# Patient Record
Sex: MALE | Employment: UNEMPLOYED | URBAN - METROPOLITAN AREA
[De-identification: names, ages, dates, MRNs, and addresses within clinical notes are randomized per-mention and may not be internally consistent; named-entity substitution may affect disease eponyms.]

---

## 2021-08-11 ENCOUNTER — OFFICE VISIT (OUTPATIENT)
Dept: HEMATOLOGY | Age: 36
End: 2021-08-11
Payer: MEDICAID

## 2021-08-11 ENCOUNTER — HOSPITAL ENCOUNTER (OUTPATIENT)
Dept: LAB | Age: 36
Discharge: HOME | End: 2021-08-11

## 2021-08-11 VITALS
BODY MASS INDEX: 29.82 KG/M2 | RESPIRATION RATE: 18 BRPM | OXYGEN SATURATION: 98 % | SYSTOLIC BLOOD PRESSURE: 112 MMHG | WEIGHT: 225 LBS | DIASTOLIC BLOOD PRESSURE: 68 MMHG | HEART RATE: 75 BPM | HEIGHT: 73 IN | TEMPERATURE: 98.2 F

## 2021-08-11 DIAGNOSIS — R74.8 ELEVATED LIVER ENZYMES: Primary | ICD-10-CM

## 2021-08-11 LAB — SENTARA SPECIMEN COL,SENBCF: NORMAL

## 2021-08-11 PROCEDURE — 99204 OFFICE O/P NEW MOD 45 MIN: CPT | Performed by: NURSE PRACTITIONER

## 2021-08-11 PROCEDURE — 99001 SPECIMEN HANDLING PT-LAB: CPT

## 2021-08-11 RX ORDER — FAMOTIDINE 20 MG/1
20 TABLET, FILM COATED ORAL 2 TIMES DAILY
COMMUNITY

## 2021-08-11 RX ORDER — LUBIPROSTONE 24 UG/1
24 CAPSULE, GELATIN COATED ORAL 2 TIMES DAILY WITH MEALS
COMMUNITY
Start: 2020-12-07 | End: 2021-12-07

## 2021-08-11 RX ORDER — TRETINOIN 0.25 MG/G
CREAM TOPICAL
COMMUNITY
Start: 2021-07-31

## 2021-08-11 RX ORDER — METFORMIN HYDROCHLORIDE 500 MG/1
500 TABLET ORAL 2 TIMES DAILY WITH MEALS
COMMUNITY
Start: 2021-01-21

## 2021-08-11 RX ORDER — PANTOPRAZOLE SODIUM 20 MG/1
20 TABLET, DELAYED RELEASE ORAL 2 TIMES DAILY
COMMUNITY
Start: 2021-01-21 | End: 2022-01-21

## 2021-08-11 NOTE — PROGRESS NOTES
[address] [place] [address], MD, Domo Grande MD, MPH      Boris Lezama, RENEE Arreola, [facility]     Sonya Chavez [facility]   [place] MILDR[facility]       Stephen Shelby [facility] 136    at [address], [address] [place] [address] 22.    [phone]    FAX: [address] [place] [address], [address]    [phone]    FAX: [phone]       Patient Care Team:  Stanislaw Maharaj DO as PCP - General (Family Medicine)     The patient  Arcelia Gimenez. is self referred for evaluation of his liver and to screen for HCV. The patient is a 38 y.o.  male who was diagnosed with IBD and complains of constipation. Serologic evaluation for markers of chronic liver disease has either not been performed or the results are not available. Imaging of the liver was either not performed or the results are not available to me. An assessment of liver fibrosis with biopsy, Fibroscan or elastography has not been performed. The patient has the following symptoms which could be attributed to the liver disorder:  pain in the right side over the liver, nausea, constipation. The patient is not experiencing the following symptoms which are commonly seen in this liver disorder: fatigue, yellowing of the eyes or skin, itching, dark urine. The patient has Mild limitations in functional activities which can be attributed to other medical problems that are not related to the liver disease.       ASSESSMENT AND PLAN:  Assess for HCV    Liver transaminases are normal.  ALP is normal. Liver function is normal.  The platelet count is normal.      Serologic testing for causes of chronic liver disease was ordered. All was negative. Additional serologic tests to screen for other causes of chronic liver disease were negative. The need to assess liver fibrosis was discussed. Shear wave elastography can assess liver fibrosis and determine if a patient has advanced fibrosis or cirrhosis without the need for liver biopsy. This will be scheduled. If the elastography suggests advanced fibrosis then a liver biopsy should be considered. The elastography can be repeated annually or as often as clinically indicated to assess for fibrosis progression and/or regression. If the liver enzymes remain normal and Elastography remains normal or near normal over the next 1 year than no further monitoring is needed. Have performed laboratory testing to monitor liver function and degree of liver injury. This included BMP, hepatic panel, CBC with platelet count. Will perform imaging of the liver with ultrasound. Screening for Hepatocellular Carcinoma  HCC screening is not necessary if the patient has no evidence of cirrhosis. Treatment of other medical problems in patients with chronic liver disease  There are no contraindications for the patient to take most medications that are necessary for treatment of other medical issues. The patient can take any medications utilized for treatment of DM, statins to treat hypercholesterolemia    Normal doses of acetaminophen, as recommended on the label of the bottle, are not hepatotoxic except in the setting of daily alcohol use, even in patients with cirrhosis and can be utilized for pain. Counseling for alcohol in patients with chronic liver disease  The patient does not have a chronic liver disease and does not have to be abstinent from alcohol. Vaccinations   Vaccination for viral hepatitis A and B is recommended since the patient has no serologic evidence of previous exposure or vaccination with immunity.   Routine vaccinations against other bacterial and viral agents can be performed as indicated. Annual flu vaccination should be administered if indicated. ALLERGIES  Allergies   Allergen Reactions    Motrin [Ibuprofen] Other (comments)       MEDICATIONS  Current Outpatient Medications   Medication Sig    pantoprazole (PROTONIX) 20 mg tablet Take 20 mg by mouth two (2) times a day.  metFORMIN (GLUCOPHAGE) 500 mg tablet Take 500 mg by mouth two (2) times daily (with meals).  famotidine (Pepcid) 20 mg tablet Take 20 mg by mouth two (2) times a day.  peppermint oil (IBGARD PO) Take  by mouth.  lubiPROStone (AMITIZA) 24 mcg capsule Take 24 mcg by mouth two (2) times daily (with meals).  tretinoin (RETIN-A) 0.025 % topical cream APPLY TO FACE EVERY NIGHT FOR ACNE     No current facility-administered medications for this visit. SYSTEM REVIEW NOT RELATED TO LIVER DISEASE OR REVIEWED ABOVE:  Constitution systems: Negative for fever, chills, weight gain, weight loss. Eyes: Negative for visual changes. ENT: Negative for sore throat, painful swallowing. Respiratory: Negative for cough, hemoptysis, SOB. Cardiology: Negative for chest pain, palpitations. GI:  Negative for constipation or diarrhea. : Negative for urinary frequency, dysuria, hematuria, nocturia. Skin: Negative for rash. Hematology: Negative for easy bruising, blood clots. Musculo-skelatal: Negative for back pain, muscle pain, weakness. Neurologic: Negative for headaches, dizziness, vertigo, memory problems not related to HE. Psychology: Negative for anxiety, depression. FAMILY HISTORY:  The father  Has/had the following following chronic disease(s): Type 1 DM, CAD, pacemaker  The mother Has/had the following chronic disease(s): DM, GERD. The following family members have liver disease: Uncle had cirrhosis secondary to alcohol. SOCIAL HISTORY:  The patient is single.   The patient has 5 children,   The patient currently smokes 1 pack of tobacco daily. The patient has previously consumed alcohol in excess. The patient has been abstinent from alcohol since 2019. The patient is unemployed. PHYSICAL EXAMINATION:  Visit Vitals  /68 (BP 1 Location: Left upper arm, BP Patient Position: Sitting, BP Cuff Size: Large adult)   Pulse 75   Temp 98.2 °F (36.8 °C)   Resp 18   Ht 6' 1\" (1.854 m)   Wt 225 lb (102.1 kg)   SpO2 98%   BMI 29.69 kg/m²     General: No acute distress. Eyes: Sclera anicteric. ENT: No oral lesions. Thyroid normal.  Nodes: No adenopathy. Skin: No spider angiomata. No jaundice. No palmar erythema. Respiratory: Lungs clear to auscultation. Cardiovascular: Regular heart rate. No murmurs. No JVD. Abdomen: Soft non-tender. Liver size normal to percussion/palpation. Spleen not palpable. No obvious ascites. Extremities: No edema. No muscle wasting. No gross arthritic changes. Neurologic: Alert and oriented. Cranial nerves grossly intact. No asterixis. LABORATORY STUDIES:  [facility] [place] [facility] [address] [place] [address] Units 8/11/2021   WBC 4.0 - 11.0 K/uL 4.9   ANC 1.8 - 7.7 K/uL 2.5   HGB 13.2 - 17.3 g/dL 13.0 (L)    - 440 K/uL 297   AST 10 - 37 U/L 13   ALT 5 - 40 U/L 19   Alk Phos 25 - 115 U/L 66   Bili, Total 0.2 - 1.2 mg/dL 0.5   Bili, Direct 0.0 - 0.3 mg/dL <0.2   Albumin 3.5 - 5.0 g/dL 4.5   BUN 6 - 22 mg/dL 9   Creat 0.5 - 1.2 mg/dL 0.8   Na 133 - 145 mmol/L 139   K 3.5 - 5.5 mmol/L 4.3   Cl 98 - 110 mmol/L 102   CO2 20 - 32 mmol/L 27   Glucose 70 - 99 mg/dL 90     SEROLOGIES:  Serologies Latest Ref Rng & Units 8/11/2021   Hep A Ab, Total Negative Negative   Hep B Surface Ag None Detec None Detected   Hep B Core Ab, Total None Detec None Detected   Hep B Surface Ab None Detec None Detected   Ferritin 22 - 322 ng/mL 57   Iron % Saturation 20 - 50 % 22 8/2021. Anti HCV. Negative.   HgA1c 5.6%    LIVER HISTOLOGY:  Not available or performed    ENDOSCOPIC PROCEDURES:  Not available or performed    RADIOLOGY:  Not available or performed    OTHER TESTING:  Not available or performed    FOLLOW-UP:  All of the issues listed above in the Assessment and Plan were discussed with the patient. All questions were answered. The patient expressed a clear understanding of the above. [address] [address] in 4 weeks to review all data and determine the treatment plan.       Cherise Ganser, AGPCNP-BC  [address] [facility] [place] [facility] [place] [address] [address]  [address] [place] [address], [address] [place] [address] Street [address] [phone]

## 2021-08-12 LAB
A-G RATIO,AGRAT: 1.5 RATIO (ref 1.1–2.6)
ABSOLUTE LYMPHOCYTE COUNT, 10803: 2 K/UL (ref 1–4.8)
ALBUMIN SERPL-MCNC: 4.5 G/DL (ref 3.5–5)
ALP SERPL-CCNC: 66 U/L (ref 25–115)
ALT SERPL-CCNC: 19 U/L (ref 5–40)
ANION GAP SERPL CALC-SCNC: 10 MMOL/L (ref 3–15)
AST SERPL W P-5'-P-CCNC: 13 U/L (ref 10–37)
AVG GLU, 10930: 114 MG/DL (ref 91–123)
BASOPHILS # BLD: 0 K/UL (ref 0–0.2)
BASOPHILS NFR BLD: 0 % (ref 0–2)
BILIRUB SERPL-MCNC: 0.5 MG/DL (ref 0.2–1.2)
BILIRUBIN, DIRECT,CBIL: <0.2 MG/DL (ref 0–0.3)
BUN SERPL-MCNC: 9 MG/DL (ref 6–22)
CALCIUM SERPL-MCNC: 9.7 MG/DL (ref 8.4–10.5)
CHLORIDE SERPL-SCNC: 102 MMOL/L (ref 98–110)
CO2 SERPL-SCNC: 27 MMOL/L (ref 20–32)
CREAT SERPL-MCNC: 0.8 MG/DL (ref 0.5–1.2)
EOSINOPHIL # BLD: 0 K/UL (ref 0–0.5)
EOSINOPHIL NFR BLD: 1 % (ref 0–6)
ERYTHROCYTE [DISTWIDTH] IN BLOOD BY AUTOMATED COUNT: 15.3 % (ref 10–15.5)
FE % SATURATION,PSAT: 22 % (ref 20–50)
FERRITIN SERPL-MCNC: 57 NG/ML (ref 22–322)
GFRAA, 66117: >60
GFRNA, 66118: >60
GLOBULIN,GLOB: 3.1 G/DL (ref 2–4)
GLUCOSE SERPL-MCNC: 90 MG/DL (ref 70–99)
GRANULOCYTES,GRANS: 51 % (ref 40–75)
HBA1C MFR BLD HPLC: 5.6 % (ref 4.8–5.6)
HBV SURFACE AB SER RIA-ACNC: NORMAL
HCT VFR BLD AUTO: 40.4 % (ref 36.6–51.9)
HCV AB SER IA-ACNC: NORMAL
HEP B CORE AB, TOTAL, 006718: NORMAL
HEP B SURFACE AG SCRN, 006510: NORMAL
HGB BLD-MCNC: 13 G/DL (ref 13.2–17.3)
IRON,IRN: 73 MCG/DL (ref 45–160)
LYMPHOCYTES, LYMLT: 40 % (ref 20–45)
MCH RBC QN AUTO: 33 PG (ref 26–34)
MCHC RBC AUTO-ENTMCNC: 32 G/DL (ref 31–36)
MCV RBC AUTO: 103 FL (ref 80–95)
MONOCYTES # BLD: 0.4 K/UL (ref 0.1–1)
MONOCYTES NFR BLD: 8 % (ref 3–12)
NEUTROPHILS # BLD AUTO: 2.5 K/UL (ref 1.8–7.7)
PLATELET # BLD AUTO: 297 K/UL (ref 140–440)
PMV BLD AUTO: 8.8 FL (ref 9–13)
POTASSIUM SERPL-SCNC: 4.3 MMOL/L (ref 3.5–5.5)
PROT SERPL-MCNC: 7.6 G/DL (ref 6.4–8.3)
RBC # BLD AUTO: 3.93 M/UL (ref 3.8–5.8)
SODIUM SERPL-SCNC: 139 MMOL/L (ref 133–145)
TIBC,TIBC: 338 MCG/DL (ref 228–428)
UIBC SERPL-MCNC: 265 MCG/DL (ref 110–370)
WBC # BLD AUTO: 4.9 K/UL (ref 4–11)

## 2021-08-13 PROBLEM — K52.9 INFLAMMATORY BOWEL DISEASES (IBD): Status: ACTIVE | Noted: 2021-08-13

## 2021-08-13 PROBLEM — E11.9 CONTROLLED TYPE 2 DIABETES MELLITUS, WITHOUT LONG-TERM CURRENT USE OF INSULIN (HCC): Status: ACTIVE | Noted: 2021-08-13

## 2021-08-13 LAB — HEP A AB, TOTAL, 006726: NEGATIVE

## 2021-08-27 ENCOUNTER — HOSPITAL ENCOUNTER (OUTPATIENT)
Dept: ULTRASOUND IMAGING | Age: 36
Discharge: HOME | End: 2021-08-27
Attending: NURSE PRACTITIONER
Payer: MEDICAID

## 2021-08-27 DIAGNOSIS — R74.8 ELEVATED LIVER ENZYMES: ICD-10-CM

## 2021-08-27 PROCEDURE — 76981 USE PARENCHYMA: CPT
